# Patient Record
Sex: MALE | Race: WHITE | NOT HISPANIC OR LATINO | Employment: FULL TIME | URBAN - METROPOLITAN AREA
[De-identification: names, ages, dates, MRNs, and addresses within clinical notes are randomized per-mention and may not be internally consistent; named-entity substitution may affect disease eponyms.]

---

## 2023-05-23 ENCOUNTER — APPOINTMENT (OUTPATIENT)
Dept: CT IMAGING | Facility: CLINIC | Age: 58
End: 2023-05-23
Attending: EMERGENCY MEDICINE
Payer: OTHER MISCELLANEOUS

## 2023-05-23 ENCOUNTER — HOSPITAL ENCOUNTER (EMERGENCY)
Facility: CLINIC | Age: 58
Discharge: HOME | End: 2023-05-23
Attending: EMERGENCY MEDICINE | Admitting: EMERGENCY MEDICINE
Payer: OTHER MISCELLANEOUS

## 2023-05-23 ENCOUNTER — APPOINTMENT (OUTPATIENT)
Dept: GENERAL RADIOLOGY | Facility: CLINIC | Age: 58
End: 2023-05-23
Attending: EMERGENCY MEDICINE
Payer: OTHER MISCELLANEOUS

## 2023-05-23 VITALS
OXYGEN SATURATION: 98 % | SYSTOLIC BLOOD PRESSURE: 155 MMHG | TEMPERATURE: 97.4 F | RESPIRATION RATE: 18 BRPM | HEART RATE: 98 BPM | DIASTOLIC BLOOD PRESSURE: 89 MMHG

## 2023-05-23 DIAGNOSIS — S01.01XA LACERATION OF SCALP, INITIAL ENCOUNTER: ICD-10-CM

## 2023-05-23 DIAGNOSIS — S00.03XA CONTUSION OF SCALP, INITIAL ENCOUNTER: ICD-10-CM

## 2023-05-23 LAB
ANION GAP SERPL CALCULATED.3IONS-SCNC: 12 MMOL/L (ref 7–15)
BASOPHILS # BLD AUTO: 0.1 10E3/UL (ref 0–0.2)
BASOPHILS NFR BLD AUTO: 1 %
BUN SERPL-MCNC: 13.1 MG/DL (ref 6–20)
CALCIUM SERPL-MCNC: 9.3 MG/DL (ref 8.6–10)
CHLORIDE SERPL-SCNC: 100 MMOL/L (ref 98–107)
CREAT SERPL-MCNC: 0.75 MG/DL (ref 0.67–1.17)
DEPRECATED HCO3 PLAS-SCNC: 26 MMOL/L (ref 22–29)
EOSINOPHIL # BLD AUTO: 0.1 10E3/UL (ref 0–0.7)
EOSINOPHIL NFR BLD AUTO: 1 %
ERYTHROCYTE [DISTWIDTH] IN BLOOD BY AUTOMATED COUNT: 17.8 % (ref 10–15)
GFR SERPL CREATININE-BSD FRML MDRD: >90 ML/MIN/1.73M2
GLUCOSE SERPL-MCNC: 317 MG/DL (ref 70–99)
HCT VFR BLD AUTO: 51.2 % (ref 40–53)
HGB BLD-MCNC: 16.6 G/DL (ref 13.3–17.7)
IMM GRANULOCYTES # BLD: 0.1 10E3/UL
IMM GRANULOCYTES NFR BLD: 1 %
LYMPHOCYTES # BLD AUTO: 1.4 10E3/UL (ref 0.8–5.3)
LYMPHOCYTES NFR BLD AUTO: 12 %
MCH RBC QN AUTO: 27.4 PG (ref 26.5–33)
MCHC RBC AUTO-ENTMCNC: 32.4 G/DL (ref 31.5–36.5)
MCV RBC AUTO: 85 FL (ref 78–100)
MONOCYTES # BLD AUTO: 0.9 10E3/UL (ref 0–1.3)
MONOCYTES NFR BLD AUTO: 8 %
NEUTROPHILS # BLD AUTO: 8.7 10E3/UL (ref 1.6–8.3)
NEUTROPHILS NFR BLD AUTO: 77 %
NRBC # BLD AUTO: 0 10E3/UL
NRBC BLD AUTO-RTO: 0 /100
PLATELET # BLD AUTO: 403 10E3/UL (ref 150–450)
POTASSIUM SERPL-SCNC: 4 MMOL/L (ref 3.4–5.3)
RBC # BLD AUTO: 6.06 10E6/UL (ref 4.4–5.9)
SODIUM SERPL-SCNC: 138 MMOL/L (ref 136–145)
WBC # BLD AUTO: 11.3 10E3/UL (ref 4–11)

## 2023-05-23 PROCEDURE — 80048 BASIC METABOLIC PNL TOTAL CA: CPT | Performed by: EMERGENCY MEDICINE

## 2023-05-23 PROCEDURE — 36415 COLL VENOUS BLD VENIPUNCTURE: CPT | Performed by: EMERGENCY MEDICINE

## 2023-05-23 PROCEDURE — 72125 CT NECK SPINE W/O DYE: CPT

## 2023-05-23 PROCEDURE — 99285 EMERGENCY DEPT VISIT HI MDM: CPT | Mod: 25

## 2023-05-23 PROCEDURE — 71045 X-RAY EXAM CHEST 1 VIEW: CPT

## 2023-05-23 PROCEDURE — 12002 RPR S/N/AX/GEN/TRNK2.6-7.5CM: CPT

## 2023-05-23 PROCEDURE — 85004 AUTOMATED DIFF WBC COUNT: CPT | Performed by: EMERGENCY MEDICINE

## 2023-05-23 PROCEDURE — 73030 X-RAY EXAM OF SHOULDER: CPT | Mod: LT

## 2023-05-23 PROCEDURE — 70450 CT HEAD/BRAIN W/O DYE: CPT

## 2023-05-23 RX ORDER — LIDOCAINE HYDROCHLORIDE AND EPINEPHRINE 10; 10 MG/ML; UG/ML
5 INJECTION, SOLUTION INFILTRATION; PERINEURAL ONCE
Status: DISCONTINUED | OUTPATIENT
Start: 2023-05-23 | End: 2023-05-23 | Stop reason: HOSPADM

## 2023-05-23 ASSESSMENT — ACTIVITIES OF DAILY LIVING (ADL)
ADLS_ACUITY_SCORE: 35
ADLS_ACUITY_SCORE: 35

## 2023-05-23 NOTE — ED PROVIDER NOTES
History     Chief Complaint:  Head Injury       The history is provided by the patient.      Sesar Raya is a 57 year old male who presents with a laceration to the back of the head. The patient was using a riding  when he bumped into a metal light poll and a glass cover fell and hit him on the top of the head causing a laceration. He now endorses left sided neck pain, shoulder pain, headache, and possible loss of consciousness. He denies any abdominal pain, hip pain, or shortness of breath.  No back pain.  No numbness or weakness.  He did not have a loss of consciousness.  No vomiting.    Independent Historian:   A coworker is present and adds additional history describing the mechanism of the trauma today.    Review of External Notes:   Office visit reviewed from September 15, 2020 when the patient was seen for management of his diabetes    Medications:    Metformin     Past Medical History:    Diabetes      Past Surgical History:    The patient has no known past surgical history      Physical Exam     Patient Vitals for the past 24 hrs:   BP Temp Pulse Resp SpO2   05/23/23 1707 (!) 155/89 -- 98 18 98 %   05/23/23 1404 (!) 169/106 97.4  F (36.3  C) 113 18 96 %        Physical Exam  Constitutional:       General: He is not in acute distress.     Appearance: Normal appearance. He is not toxic-appearing.   HENT:      Head: Atraumatic.      Right Ear: Tympanic membrane, ear canal and external ear normal.      Left Ear: Tympanic membrane, ear canal and external ear normal.      Ears:      Comments: No hemotympanum     Mouth/Throat:      Mouth: Mucous membranes are moist.      Pharynx: Oropharynx is clear.   Eyes:      General: No scleral icterus.     Conjunctiva/sclera: Conjunctivae normal.      Pupils: Pupils are equal, round, and reactive to light.   Neck:      Comments: No midline C-spine tenderness.  Cardiovascular:      Rate and Rhythm: Normal rate and regular rhythm.      Heart sounds: Normal heart  sounds.   Pulmonary:      Effort: Pulmonary effort is normal. No respiratory distress.      Breath sounds: Normal breath sounds.   Abdominal:      General: There is no distension.      Palpations: Abdomen is soft.      Tenderness: There is no abdominal tenderness.   Musculoskeletal:         General: No deformity.      Cervical back: Neck supple.      Comments: No midline tenderness in the thoracic or lumbar spine.  No deformity or tenderness or wounds of the extremities.   Skin:     General: Skin is warm.      Capillary Refill: Capillary refill takes less than 2 seconds.      Comments: Abrasion without laceration over the helix of the left ear.  There are 2 lacerations over the high occipital scalp, one measuring 3 cm and another measuring 2 cm.  These are superficial and do not penetrate to the galea.   Neurological:      General: No focal deficit present.      Mental Status: He is alert and oriented to person, place, and time.      Comments: Speech is fluent.  Gait is normal.  Strength and sensation grossly intact.   Psychiatric:         Mood and Affect: Mood normal.         Behavior: Behavior normal.           Emergency Department Course     Imaging:  XR Chest 1 View   Final Result   IMPRESSION: Single AP view of the chest was obtained. The   cardiomediastinal silhouette is within normal limits. No suspicious   focal pulmonary opacities. No significant pleural effusion or   pneumothorax. No definite acute osseous pathology. If clinical   suspicion of rib fractures, remains high, rib series is more sensitive   for detection of subtle rib fractures.       NANCY DICKINSON MD            SYSTEM ID:  [id number]      XR Shoulder Left 3 Views   Final Result   IMPRESSION: Moderate osteoarthrosis of the AC joint. The glenohumeral   joint appears normal. There is no evidence of fracture. No subluxation   or dislocation.       SAIMA OLIVAS MD            SYSTEM ID:  [id number]      CT Cervical Spine w/o Contrast    Final Result   IMPRESSION: There is normal alignment of the cervical vertebrae;   however, there is straightening of normal cervical lordosis. Vertebral   body heights of the cervical spine are normal. Craniocervical   alignment is normal. There is no evidence for fracture of the cervical   spine. Loss of disc space height and degenerative endplate spurring at   C4-C5, C5-C6 and C6-C7 levels. Mild facet arthropathy throughout the   cervical spine. Posterior disc bulges to varying degrees throughout   the cervical spine. Mild degenerative spinal canal narrowing at C4-C5   and C5-C6. No high-grade spinal canal stenosis. No prevertebral soft   tissue swelling.         EAGLE ALSTON MD            SYSTEM ID:  [id number]      CT Head w/o Contrast   Final Result   IMPRESSION: Small posterior left parietal scalp hematoma. Otherwise,   normal head CT.            EAGLE ALSTON MD            SYSTEM ID:  [id number]         Report per radiology    Laboratory:  Labs Ordered and Resulted from Time of ED Arrival to Time of ED Departure   BASIC METABOLIC PANEL - Abnormal       Result Value    Sodium 138      Potassium 4.0      Chloride 100      Carbon Dioxide (CO2) 26      Anion Gap 12      Urea Nitrogen 13.1      Creatinine 0.75      Calcium 9.3      Glucose 317 (*)     GFR Estimate >90     CBC WITH PLATELETS AND DIFFERENTIAL - Abnormal    WBC Count 11.3 (*)     RBC Count 6.06 (*)     Hemoglobin 16.6      Hematocrit 51.2      MCV 85      MCH 27.4      MCHC 32.4      RDW 17.8 (*)     Platelet Count 403      % Neutrophils 77      % Lymphocytes 12      % Monocytes 8      % Eosinophils 1      % Basophils 1      % Immature Granulocytes 1      NRBCs per 100 WBC 0      Absolute Neutrophils 8.7 (*)     Absolute Lymphocytes 1.4      Absolute Monocytes 0.9      Absolute Eosinophils 0.1      Absolute Basophils 0.1      Absolute Immature Granulocytes 0.1      Absolute NRBCs 0.0          Procedures     Laceration Repair      Procedure:  Laceration Repair    Indication: Laceration    Consent: Verbal    Location: Occipital scalp     Length: 2 cm    Preparation: Irrigation with Sterile Saline.    Anesthesia/Sedation: Lidocaine with Epinephrine - 1%      Treatment/Exploration: Wound explored, no foreign bodies found     Closure: The wound was closed with 3 staples.    Patient Status: The patient tolerated the procedure well: Yes. There were no complications.      Laceration Repair      Procedure: Laceration Repair    Indication: Laceration    Consent: Verbal    Location: Occipital scalp     Length: 3 cm    Preparation: Irrigation with Sterile Saline.    Anesthesia/Sedation: Lidocaine with Epinephrine - 1%      Treatment/Exploration: Wound explored, no foreign bodies found     Closure: The wound was closed with 4 staples.    Patient Status: The patient tolerated the procedure well: Yes. There were no complications.    Emergency Department Course & Assessments:       Interventions:  Medications   lidocaine 1% with EPINEPHrine 1:100,000 injection 5 mL (has no administration in time range)      Assessments:  1406 I consulted with the patient and obtained history as shown above  1532 I rechecked the patient   1622 I rechecked the patient and performed procedure as shown above  1653 I rechecked the patient     Disposition:  The patient was discharged to home.     Impression & Plan      Medical Decision Making:  This patient is a 57-year-old man who presents to the emergency department for evaluation following an injury.  CT scan of the head and C-spine negative.  X-ray of the left shoulder and chest negative.  No other injuries are apparent.  The patient had the wounds cleaned and repaired as described.  We discussed the follow-up care for his wounds as well as the likelihood of postconcussive syndrome.  He was advised to follow-up with his clinic through Worker[employer]. or his primary care doctor for clearance to go back to work.      Diagnosis:    ICD-10-CM     1. Laceration of scalp, initial encounter  S01.01XA       2. Contusion of scalp, initial encounter  S00.03XA           Scribe Disclosure:  I, Silas Hancock, am serving as a scribe at 2:13 PM on 5/23/2023 to document services personally performed by Jose Blanton MD based on my observations and the provider's statements to me.     5/23/2023   Jose Blanton MD McRoberts, Sean Edward, MD  05/23/23 3338

## 2023-05-23 NOTE — ED TRIAGE NOTES
Patient presents to the ED following a head injury. Patient states was mowing and hit a street light. States the large lamp fell off the light pole over 20 feet high and struck him in the head. Reports a near loss of consciousness. Denies use of blood thinners. Presents to triage with a bleeding laceration to left posterior head. Dressing applied. Also reports left lateral neck and shoulder pain.

## 2023-05-23 NOTE — Clinical Note
Sesar Raya was seen and treated in our emergency department on 5/23/2023.  He may return to work on 05/26/2023.       If you have any questions or concerns, please don't hesitate to call.      Jose Blanton MD

## 2023-05-23 NOTE — DISCHARGE INSTRUCTIONS
Follow-up in 2 to 3 days with your work comp clinic or your primary care doctor for clearance to go back to work.    Keep your wounds clean.  Apply an antibiotic ointment such as bacitracin or Neosporin 1-2 times per day.    Return to the ER immediately for worsening pain, worsening dizziness, redness or swelling around your wound, fever, or any new concerns.    You will need to follow-up with your primary care clinic or your Worker's Comp. clinic to have your staples removed in 10 days.